# Patient Record
Sex: MALE | Race: WHITE | URBAN - METROPOLITAN AREA
[De-identification: names, ages, dates, MRNs, and addresses within clinical notes are randomized per-mention and may not be internally consistent; named-entity substitution may affect disease eponyms.]

---

## 2021-04-01 ENCOUNTER — OFFICE VISIT - HEALTHEAST (OUTPATIENT)
Dept: FAMILY MEDICINE | Facility: CLINIC | Age: 41
End: 2021-04-01

## 2021-04-01 DIAGNOSIS — R07.0 THROAT PAIN: ICD-10-CM

## 2021-04-01 DIAGNOSIS — Z20.822 ENCOUNTER FOR LABORATORY TESTING FOR COVID-19 VIRUS: ICD-10-CM

## 2021-04-01 DIAGNOSIS — R43.2 LOSS OF TASTE: ICD-10-CM

## 2021-04-01 RX ORDER — ATORVASTATIN CALCIUM 40 MG/1
40 TABLET, FILM COATED ORAL AT BEDTIME
Status: SHIPPED | COMMUNITY
Start: 2021-04-01

## 2021-04-01 RX ORDER — LISINOPRIL 10 MG/1
10 TABLET ORAL DAILY
Status: SHIPPED | COMMUNITY
Start: 2021-04-01

## 2021-04-01 RX ORDER — HYDROCHLOROTHIAZIDE 12.5 MG/1
12.5 TABLET ORAL DAILY
Status: SHIPPED | COMMUNITY
Start: 2021-04-01

## 2021-04-02 ENCOUNTER — COMMUNICATION - HEALTHEAST (OUTPATIENT)
Dept: FAMILY MEDICINE | Facility: CLINIC | Age: 41
End: 2021-04-02

## 2021-04-02 LAB
SARS-COV-2 PCR COMMENT: ABNORMAL
SARS-COV-2 RNA SPEC QL NAA+PROBE: POSITIVE
SARS-COV-2 VIRUS SPECIMEN SOURCE: ABNORMAL

## 2021-04-03 ENCOUNTER — COMMUNICATION - HEALTHEAST (OUTPATIENT)
Dept: SCHEDULING | Facility: CLINIC | Age: 41
End: 2021-04-03

## 2021-04-03 ENCOUNTER — COMMUNICATION - HEALTHEAST (OUTPATIENT)
Dept: NURSING | Facility: CLINIC | Age: 41
End: 2021-04-03

## 2021-05-27 VITALS
SYSTOLIC BLOOD PRESSURE: 148 MMHG | HEART RATE: 90 BPM | RESPIRATION RATE: 20 BRPM | OXYGEN SATURATION: 98 % | TEMPERATURE: 98.4 F | DIASTOLIC BLOOD PRESSURE: 84 MMHG

## 2021-06-16 NOTE — TELEPHONE ENCOUNTER
Patient Called Clinic to find out about Covid Results from Last night at [facility], so I returned His Phone call,  Note: Results were not available yet,  I said to keep an eye on his MyChart.

## 2021-06-16 NOTE — PATIENT INSTRUCTIONS - HE
"Fluids, rest, diet as tolerated  Vitamin C and zinc, lozenges, tylenol or ibuprofen if needed for throat pain.  May use cough medications if needed  Covid test is pending  No work pending the result and until symptoms improving, likely a few days at least if test is negative.   Note written for work.     Discharge Instructions for COVID-19 Patients  You have--or may have--COVID-19. Please follow the instructions listed below.   If you have a weakened immune system, discuss with your doctor any other actions you need to take.  How can I protect others?  If you have symptoms (fever, cough, body aches or trouble breathing):    Stay home and away from others (self-isolate) until:  ? Your other symptoms have resolved (gotten better). And   ? You've had no fever--and no medicine that reduces fever--for 1 full day (24 hours). And   ? At least 10 days have passed since your symptoms started. (You may need to wait 20 days. Follow the advice of your care team.)  If you don't show symptoms, but testing showed that you have COVID-19:    Stay home and away from others (self-isolate) until at least 10 days have passed since the date of your first positive COVID-19 test.  During this time    Stay in your own room, even for meals. Use your own bathroom if you can.    Stay away from others in your home. No hugging, kissing or shaking hands. No visitors.    Don't go to work, school or anywhere else.    Clean \"high touch\" surfaces often (doorknobs, counters, handles). Use household cleaning spray or wipes.    You'll find a full list of  on the EPA website: www.epa.gov/pesticide-registration/list-n-disinfectants-use-against-sars-cov-2.    Cover your mouth and nose with a mask or other face covering to avoid spreading germs.    Wash your hands and face often. Use soap and water.    Caregivers in these groups are at risk for severe illness due to COVID-19:  ? People 65 years and older  ? People who live in a nursing home or " long-term care facility  ? People with chronic disease (lung, heart, cancer, diabetes, kidney, liver, immunologic)  ? People who have a weakened immune system, including those who:    Are in cancer treatment    Take medicine that weakens the immune system, such as corticosteroids    Had a bone marrow or organ transplant    Have an immune deficiency    Have poorly controlled HIV or AIDS    Are obese (body mass index of 40 or higher)    Smoke regularly    Caregivers should wear gloves while washing dishes, handling laundry and cleaning bedrooms and bathrooms.    Use caution when washing and drying laundry: Don't shake dirty laundry and use the warmest water setting that you can.    For more tips on managing your health at home, go to www.cdc.gov/coronavirus/2019-ncov/downloads/10Things.pdf.  How can I take care of myself at home?  1. Get lots of rest. Drink extra fluids (unless a doctor has told you not to).  2. Take Tylenol (acetaminophen) for fever or pain. If you have liver or kidney problems, ask your family doctor if it's okay to take Tylenol.   Adults can take either:   ? 650 mg (two 325 mg pills) every 4 to 6 hours, or   ? 1,000 mg (two 500 mg pills) every 8 hours as needed.  ? Note: Don't take more than 3,000 mg in one day. Acetaminophen is found in many medicines (both prescribed and over-the-counter medicines). Read all labels to be sure you don't take too much.   For children, check the Tylenol bottle for the right dose. The dose is based on the child's age or weight.  3. If you have other health problems (like cancer, heart failure, an organ transplant or severe kidney disease): Call your specialty clinic if you don't feel better in the next 2 days.  4. Know when to call 911. Emergency warning signs include:  ? Trouble breathing or shortness of breath  ? Pain or pressure in the chest that doesn't go away  ? Feeling confused like you haven't felt before, or not being able to wake up  ? Bluish-colored lips  or face  5. Your doctor may have prescribed a blood thinner medicine. Follow their instructions.  Where can I get more information?    [facility] - About COVID-19:   [email or web address]    CDC - What to Do If You're Sick: www.cdc.gov/coronavirus/2019-ncov/about/steps-when-sick.html    CDC - Ending Home Isolation: www.cdc.gov/coronavirus/2019-ncov/hcp/disposition-in-home-patients.html    [facility] - Caring for Someone: www.cdc.gov/coronavirus/2019-ncov/if-you-are-sick/care-for-someone.html    [facility] - Interim Guidance for Hospital Discharge to Home: www.[facility].[place]/diseases/coronavirus/hcp/hospdischarge.pdf    Below are the COVID-19 hotlines at the [place] [facility] ([facility]). Interpreters are available.  ? For health questions: Call 753-308-7990 or 1-490.439.5951 (7 a.m. to 7 p.m.)  ? For questions about schools and childcare: Call 575-263-2461 or 1-377.278.2272 (7 a.m. to 7 p.m.)    For informational purposes only. Not to replace the advice of your health care provider. Clinically reviewed by Dr. David Earl.   Copyright   2020 [facility]. All rights reserved. [employer] [id number] - REV 01/05/21.

## 2021-06-16 NOTE — PROGRESS NOTES
Assessment/Plan:   Throat pain  Loss of taste  Encounter for laboratory testing for COVID-19 virus  Respiratory illness with ST, chills, fatigue, myalgia, diarrhea, loss of taste and smell, occasional cough. Has some higher risk features based on ethnicity, PMH for chance of complications from covid. Could also be other viral illness. Stable now with mild illness symptoms. Covid test is pending. Safe at this time for care at home.   I discussed red flag symptoms, return precautions to clinic/ER and follow up care with patient/parent.  Expected clinical course, symptomatic cares advised. Questions answered. Patient/parent amenable with plan below.  - Symptomatic COVID-19 Virus (CORONAVIRUS) PCR; Future  - Symptomatic COVID-19 Virus (CORONAVIRUS) PCR    Fluids, rest, diet as tolerated  Vitamin C and zinc, lozenges, tylenol or ibuprofen if needed for throat pain.  May use cough medications if needed  Covid test is pending  No work pending the result and until symptoms improving, likely a few days at least if test is negative.   Note written for work.     Provider and staff wore full PPE    Subjective:      Herbert Dallas is a 40 y.o. male who presents for evaluation of ST, chills, fatigue and myalgia. This started a couple days ago with HA and ST. Throat pain is lower, around the larynx. He has minimal pain with swallow. Occasional cough, mostly due to throat irritation. No CP or shortness of breath. Today he has had chills, fatigue, muscles are achy. He also noted prior to coming that when he brushed his teeth, he could not taste the toothpaste and thinks he is having trouble smelling as well. He had loose stools yesterday. Nausea today. No vomiting. Not much nasal congestion.   Wife developed similar symptoms last night.   He has had his flu shot.   He has received vaccine #1 Moderna on 3/19/21 and the second is due on 4/16/21. He noted some chills for a day or two after the vaccine but then has felt well until  now.   He works at [employer]. There have been some cases of covid among coworkers. They wear masks. He has not been around anyone with cough.   No other high risks for covid.   He has DM2, HTN. No asthma. Former smoker.     No Known Allergies     Current Outpatient Medications on File Prior to Visit   Medication Sig Dispense Refill     atorvastatin (LIPITOR) 40 MG tablet Take 40 mg by mouth at bedtime.       hydroCHLOROthiazide (HYDRODIURIL) 12.5 MG tablet Take 12.5 mg by mouth daily.       linaGLIPtin 5 mg Tab Take 5 mg by mouth daily.       lisinopriL (PRINIVIL,ZESTRIL) 10 MG tablet Take 10 mg by mouth daily.       metFORMIN (GLUCOPHAGE) 1000 MG tablet Take 1,000 mg by mouth 2 (two) times a day with meals.       No current facility-administered medications on file prior to visit.      There is no problem list on file for this patient.      Objective:     /84 (Patient Site: Left Arm, Patient Position: Sitting, Cuff Size: Adult Regular)   Pulse 90   Temp 98.4  F (36.9  C) (Oral)   Resp 20   SpO2 98%     Physical  General Appearance: Alert, cooperative, no distress, aVSS  Head: Normocephalic, without obvious abnormality, atraumatic  Eyes: Conjunctivae are normal.   Ears: Normal TMs and external ear canals, both ears  Nose: No significant congestion.  Throat: Throat is red posteriorly.  No exudate.  No significant lesions  Neck: slightly tender anterior adenopathy  Lungs: Clear to auscultation bilaterally, respirations unlabored  Heart: Regular rate and rhythm  Skin: no rashes or lesions  Psychiatric: Patient has a normal mood and affect.

## 2021-06-16 NOTE — TELEPHONE ENCOUNTER
Patient Called Clinic to find out about Covid Results from Last night at [facility], so I returned His Phone call,  Note: Results were not available yet,  I said to keep an eye on his MyChart.

## 2021-06-16 NOTE — TELEPHONE ENCOUNTER
Patient Called Clinic to find out about Covid Results from Last night at [facility], so I returned His Phone call,  Note: Results were not available yet,  I said to keep an eye on his MyChart.

## 2021-06-21 NOTE — LETTER
Letter by Audra Cannon MD at      Author: Audra Cannon MD Service: -- Author Type: --    Filed:  Encounter Date: 4/1/2021 Status: (Other)         April 1, 2021     Patient: Herbert Dallas   YOB: 1980   Date of Visit: 4/1/2021       To Whom it May Concern:    Herbert Dallas was seen in my clinic on 4/1/2021. He will need to be excused from work for tomorrow and any further days based on results.   If the Covid-19 test is negative, he may then return to work as long as symptoms are improving and he has had no fever for over 24 hours without medications.   If the covid-19 test is positive, he will out of work for at least 10 days from onset of illness AND no fever for 24 hours and symptoms are improving.     If you have any questions or concerns, please don't hesitate to call.    Sincerely,         Electronically signed by Audra Vernon MD

## 2021-06-27 ENCOUNTER — HEALTH MAINTENANCE LETTER (OUTPATIENT)
Age: 41
End: 2021-06-27

## 2021-10-17 ENCOUNTER — HEALTH MAINTENANCE LETTER (OUTPATIENT)
Age: 41
End: 2021-10-17

## 2022-07-24 ENCOUNTER — HEALTH MAINTENANCE LETTER (OUTPATIENT)
Age: 42
End: 2022-07-24

## 2022-10-03 ENCOUNTER — HEALTH MAINTENANCE LETTER (OUTPATIENT)
Age: 42
End: 2022-10-03

## 2023-08-12 ENCOUNTER — HEALTH MAINTENANCE LETTER (OUTPATIENT)
Age: 43
End: 2023-08-12

## 2024-08-30 ENCOUNTER — TRANSFERRED RECORDS (OUTPATIENT)
Dept: HEALTH INFORMATION MANAGEMENT | Facility: CLINIC | Age: 44
End: 2024-08-30